# Patient Record
Sex: FEMALE
[De-identification: names, ages, dates, MRNs, and addresses within clinical notes are randomized per-mention and may not be internally consistent; named-entity substitution may affect disease eponyms.]

---

## 2021-05-22 ENCOUNTER — TRANSCRIPTION ENCOUNTER (OUTPATIENT)
Age: 26
End: 2021-05-22

## 2021-12-14 ENCOUNTER — TRANSCRIPTION ENCOUNTER (OUTPATIENT)
Age: 26
End: 2021-12-14

## 2023-06-12 PROBLEM — Z00.00 ENCOUNTER FOR PREVENTIVE HEALTH EXAMINATION: Status: ACTIVE | Noted: 2023-06-12

## 2023-06-13 ENCOUNTER — APPOINTMENT (OUTPATIENT)
Dept: OTOLARYNGOLOGY | Facility: CLINIC | Age: 28
End: 2023-06-13
Payer: COMMERCIAL

## 2023-06-13 VITALS
RESPIRATION RATE: 13 BRPM | HEART RATE: 62 BPM | BODY MASS INDEX: 26.58 KG/M2 | OXYGEN SATURATION: 99 % | SYSTOLIC BLOOD PRESSURE: 124 MMHG | WEIGHT: 150 LBS | TEMPERATURE: 97.4 F | DIASTOLIC BLOOD PRESSURE: 84 MMHG | HEIGHT: 63 IN

## 2023-06-13 DIAGNOSIS — Z78.9 OTHER SPECIFIED HEALTH STATUS: ICD-10-CM

## 2023-06-13 DIAGNOSIS — Z87.891 PERSONAL HISTORY OF NICOTINE DEPENDENCE: ICD-10-CM

## 2023-06-13 DIAGNOSIS — Z83.3 FAMILY HISTORY OF DIABETES MELLITUS: ICD-10-CM

## 2023-06-13 DIAGNOSIS — Z82.49 FAMILY HISTORY OF ISCHEMIC HEART DISEASE AND OTHER DISEASES OF THE CIRCULATORY SYSTEM: ICD-10-CM

## 2023-06-13 DIAGNOSIS — H61.21 IMPACTED CERUMEN, RIGHT EAR: ICD-10-CM

## 2023-06-13 DIAGNOSIS — H93.8X2 OTHER SPECIFIED DISORDERS OF LEFT EAR: ICD-10-CM

## 2023-06-13 DIAGNOSIS — Z80.9 FAMILY HISTORY OF MALIGNANT NEOPLASM, UNSPECIFIED: ICD-10-CM

## 2023-06-13 PROCEDURE — 99203 OFFICE O/P NEW LOW 30 MIN: CPT

## 2023-06-13 PROCEDURE — 92550 TYMPANOMETRY & REFLEX THRESH: CPT | Mod: 52

## 2023-06-13 PROCEDURE — 92557 COMPREHENSIVE HEARING TEST: CPT

## 2023-06-13 RX ORDER — AMOXICILLIN AND CLAVULANATE POTASSIUM 875; 125 MG/1; MG/1
TABLET, COATED ORAL
Refills: 0 | Status: ACTIVE | COMMUNITY

## 2023-06-13 RX ORDER — METHYLPREDNISOLONE 8 MG/1
TABLET ORAL
Refills: 0 | Status: ACTIVE | COMMUNITY

## 2023-06-13 NOTE — PROCEDURE
[Cerumen Impaction] : Cerumen Impaction [Same] : same as the Pre Op Dx. [] : Removal of Cerumen [Posterior Lesion] : posterior lesion [Anterior rhinoscopy insufficient to account for symptoms] : anterior rhinoscopy insufficient to account for symptoms [Topical Lidocaine] : topical lidocaine [Oxymetazoline HCl] : oxymetazoline HCl [Serial Number: ___] : Serial Number: [unfilled] [Normal] : the paranasal sinuses had no abnormalities [FreeTextEntry6] : performed to check nasopharynx to r/o mass lesion or inflammation with referred pain\par nl [FreeTextEntry5] : l tm retracted, r copious cerumen removed atraumatically

## 2023-06-13 NOTE — REASON FOR VISIT
[Initial Evaluation] : an initial evaluation for [FreeTextEntry2] : l aural fullness, muffled hearing

## 2023-06-13 NOTE — HISTORY OF PRESENT ILLNESS
[de-identified] : 28 y/o F  is presenting with l aural fullness for the past 8 days. She woke up at 1:30 AM with l otalgia a few hrs after a flight. She went to urgent care and was prescribed Augmentin. She went back to urgent care three days later because she still felt fullness in the left ear and was told the ear looked good but was prescribed prednisone and it was recommended to follow up with an ENT. She feels her l ear is still muffled and clogged. She states prior to this she felt "run down." She had no issues with flying the day before this occurred. She denies recent uri/ flu. She had similar symptoms in 2015 and was treated with abx and steroids and it cleared after 10 days. At the time her hearing was checked and it was nl. No FH or SH pertinent to cc. Patient denies h/o childhood ear infections. Former smoker.

## 2023-06-13 NOTE — ASSESSMENT
[FreeTextEntry1] : 1. r cerumen impaction\par -cerumen removed\par -ear felt better \par 2. l ear:\par tuning fork testing  gave conflicting information- Menendez l for 128 and 256 HZ; Air[employer]one for 512 \par she asked if we could complete fmla paperwork -\par I strongly recommended  to assess her hearing and middle ear function, especially with this tuning fork result\par she agreed - it was nl \par -s/p augmentin and prednisone per urgent care\par she can autoinsufflate\par explained she probably had had an ear infection which had cleared but according to exam and  she should be able to fly\par I recommended afrin before plane flights for up to 3d no more than bid - she said she had used some but stopped a few d ago; also she can use sudafed; ear planes recommended \par she asked about completing disability paperwork for the days she had taken off - explained we did not examine her when her ear was infected and recommended she request this from the provider who saw her when she was ill

## 2023-06-13 NOTE — PHYSICAL EXAM
[Midline] : trachea located in midline position [Nasal Endoscopy Performed] : nasal endoscopy was performed, see procedure section for findings [Normal] : no nystagmus [de-identified] : l tm nl, r copious cerumen removed atraumatically with suction - tm nl [de-identified] : gait steady